# Patient Record
Sex: MALE | URBAN - METROPOLITAN AREA
[De-identification: names, ages, dates, MRNs, and addresses within clinical notes are randomized per-mention and may not be internally consistent; named-entity substitution may affect disease eponyms.]

---

## 2022-06-15 ENCOUNTER — OFFICE VISIT (OUTPATIENT)
Dept: ORTHOPEDIC SURGERY | Age: 42
End: 2022-06-15

## 2022-06-15 DIAGNOSIS — M25.511 RIGHT SHOULDER PAIN, UNSPECIFIED CHRONICITY: Primary | ICD-10-CM

## 2022-06-15 PROBLEM — G25.89 SCAPULAR DYSKINESIS: Status: ACTIVE | Noted: 2021-02-25

## 2022-06-15 PROBLEM — S43.431A GLENOID LABRAL TEAR, RIGHT, INITIAL ENCOUNTER: Status: ACTIVE | Noted: 2021-02-25

## 2022-06-15 PROBLEM — M54.2 NECK PAIN: Status: ACTIVE | Noted: 2021-02-25

## 2022-06-15 PROCEDURE — 99204 OFFICE O/P NEW MOD 45 MIN: CPT | Performed by: ORTHOPAEDIC SURGERY

## 2022-06-15 RX ORDER — MAGNESIUM OXIDE 400 MG/1
400 TABLET ORAL DAILY
COMMUNITY

## 2022-06-15 RX ORDER — HYDRALAZINE HYDROCHLORIDE 25 MG/1
25 TABLET, FILM COATED ORAL 2 TIMES DAILY
COMMUNITY

## 2022-06-15 RX ORDER — CYCLOBENZAPRINE HCL 5 MG
5 TABLET ORAL DAILY
COMMUNITY
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
## 2022-11-04 ENCOUNTER — OFFICE VISIT (OUTPATIENT)
Dept: ORTHOPEDIC SURGERY | Age: 42
End: 2022-11-04

## 2022-11-04 DIAGNOSIS — M25.511 RIGHT SHOULDER PAIN, UNSPECIFIED CHRONICITY: Primary | ICD-10-CM

## 2022-11-04 DIAGNOSIS — G56.01 RIGHT CARPAL TUNNEL SYNDROME: ICD-10-CM

## 2022-11-04 DIAGNOSIS — M67.921 TENDINOPATHY OF RIGHT BICEPS TENDON: ICD-10-CM

## 2022-11-04 PROCEDURE — 99024 POSTOP FOLLOW-UP VISIT: CPT | Performed by: ORTHOPAEDIC SURGERY
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
## 2022-11-10 ENCOUNTER — HOSPITAL ENCOUNTER (OUTPATIENT)
Dept: PHYSICAL THERAPY | Age: 42
Setting detail: RECURRING SERIES
Discharge: HOME | End: 2022-11-13

## 2022-11-10 PROCEDURE — 97110 THERAPEUTIC EXERCISES: CPT

## 2022-11-10 PROCEDURE — 97161 PT EVAL LOW COMPLEX 20 MIN: CPT

## 2022-11-10 PROCEDURE — 97140 MANUAL THERAPY 1/> REGIONS: CPT

## 2022-11-10 ASSESSMENT — PAIN SCALES - GENERAL: PAINLEVEL_OUTOF10: 0

## 2022-11-10 NOTE — PROGRESS NOTES
Estefany Schmitt  : 1980  Primary:   Secondary:  [address] @ [facility]  [address] [facility] [place] [address]  Phone: [phone]  Fax: [phone] Plan Frequency: 2x/week for 90 days    Plan of Care/Certification Expiration Date: 23      PT Visit Info:  No data recorded   Visit Count:  1   OUTPATIENT PHYSICAL THERAPY:OP NOTE TYPE: Treatment Note 11/10/2022       Episode  }Appt Desk             Treatment Diagnosis:  Pain in Right Shoulder (M25.511)  Stiffness of Right Shoulder, Not elsewhere classified (M25.611)  Medical/Referring Diagnosis:  Pain in right shoulder [M25.511]  Referring Physician:  Lester Odom MD MD Orders:  PT Eval and Treat   Date of Onset:  No data recorded   Allergies:   Patient has no allergy information on record. Restrictions/Precautions:  No data recorded  No data recorded   Interventions Planned (Treatment may consist of any combination of the following):    Current Treatment Recommendations: Strengthening; ROM; Functional mobility training; ADL/Self-care training; Neuromuscular re-education; Manual Therapy - Soft Tissue Mobilization; Manual Therapy - Joint Manipulation; Pain management; Home exercise program; Safety education & training; Patient/Caregiver education & training; Equipment evaluation, education, & procurement; Positioning; Aquatics; Therapeutic activities; Modalities; Integrated dry needling     Subjective Comments:  See Evaluation Note from today for details  Initial:}    0/10Post Session:       0/10  Medications Last Reviewed:  11/10/2022  Updated Objective Findings:  See evaluation note from today  Treatment   THERAPEUTIC EXERCISE: (15 minutes):    Exercises per grid below to improve mobility, strength, and coordination. Required minimal visual, verbal, manual, and tactile cues to promote proper body posture and promote proper body mechanics. Progressed resistance, range, repetitions, and complexity of movement as indicated. Date:  11/10 Date:   Date:     Activity/Exercise Parameters Parameters Parameters   Education Plan of car, prognosis, anatomy, HEP     B ER 3x10 blue TB     Shoulder Taps 2x10     Low Rows 3x10 black TB                           MANUAL THERAPY: (15 minutes):   Joint mobilization, Soft tissue mobilization, Manipulation, and dry needling  was utilized and necessary because of the patient's restricted joint motion, painful spasm, loss of articular motion, and restricted motion of soft tissue. Date:  11/10 Date:   Date:     Activity/Exercise Parameters Parameters Parameters   Soft Tissue Supraspinatus, infraspinatus     Joint Mobilization      Dry Needling Infraspinatus    Verbal consent obtained           Treatment/Session Summary:    Treatment Assessment:  See Evaluation Note from today for details  Communication/Consultation:  None today  Equipment provided today:  None  Recommendations/Intent for next treatment session: Next visit will focus on progression as tolerated.     Total Treatment Billable Duration:  45 minutes  Time In: 0160  Time Out: [facility], PT       Charge Capture  }Post Session Pain  PT Visit Info  [facility] Portal  MD Guidelines  Scanned Media  Benefits  MyChart    Future Appointments   Date Time Provider Cain Joy   11/15/2022  9:30 AM Bandar Camden, PT ELVIS JUAREZO   11/17/2022  9:30 AM Bandar Camden, PT LARRYOST LARRYO   11/21/2022  8:45 AM Bandar Camden, PT ELVIS JUAREZO   11/23/2022 10:15 AM Bandar Camden, PT ELVIS JUAREZO   11/29/2022  9:30 AM Bandar Camden, PT [facility]   12/1/2022  9:30 AM GAYLA Patiño O

## 2022-11-10 NOTE — THERAPY EVALUATION
Scar Evans  : 1980  Primary:   Secondary:  [address] @ [facility]  [facility] [address] [facility] [address]  Phone: [phone]  Fax: [phone] Plan Frequency: 2x/week for 90 days  Plan of Care/Certification Expiration Date: 23    PT Visit Info:         Visit Count:  1                OUTPATIENT PHYSICAL THERAPY:             OP NOTE TYPE: Initial Assessment 11/10/2022               Episode  Appt Desk         Treatment Diagnosis:  Pain in Right Shoulder (M25.511)  Stiffness of Right Shoulder, Not elsewhere classified (M25.611)  Medical/Referring Diagnosis:  Pain in right shoulder [M25.511]  Referring Physician:  Aye Joshi MD MD Orders:  PT Eval and Treat   Return MD Appt:    Date of Onset:       Allergies:  Patient has no allergy information on record. Restrictions/Precautions:           Medications Last Reviewed:  11/10/2022     SUBJECTIVE   History of Injury/Illness (Reason for Referral):  [place] Has been having R shoulder pain since then. Pain is primarily located on the top of the shoulder. Did PT for the shoulder about a year ago, got better, so he decided to go back to work. Went back to work 2-3 months ago (works as a ) and started to notice an increase of pain. Pain only comes on when he is using the arm, goes away after he stops using it. Pain now is located in the front of the shoulder and is described as stabbing, pulling. Denies any numbness/tingling in the arm, states he has some burning on the back side of his hand (nothing on the palm). Patient Stated Goal(s): \"Decrease pain, get back to life\"  Initial:     010 Post Session:     010  Past Medical History/Comorbidities:   Mr. Asim Leo  has no past medical history on file. Mr. Asim Leo  has no past surgical history on file.   Social History/Living Environment:   Lives With: Significant other  Type of Home: House     Prior Level of Function/Work/Activity:   Prior level of function: Painting, house/yard maintainance  Prior level of function: Independent  Occupation: Full time employment           Learning:   Does the patient/guardian have any barriers to learning?: No barriers  Will there be a co-learner?: No  What is the preferred language of the patient/guardian?: English  Is an  required?: No  How does the patient/guardian prefer to learn new concepts?: Listening; Reading; Demonstration; Pictures/Videos     Fall Risk Scale: Deluna Total Score: 0  Deluna Fall Risk: Low (0-24)     Personal Factors:        Sex:  male        Age:  38 y.o. OBJECTIVE   Observation  Posture:    ROM  Shoulder   Right Left   Flexion 170* 170   * 170   IR T12* T9   ER T3* T4       Strength (measured on MMT scale from 0-5/5)   Right Left   Shoulder         Flexion 4+* 5       ABD 4+* 5       IR 4+* 5       ER 4+* 5       Joint Mobility/Soft Tissue   Description   Joint Mobility Hypomobile   Soft Tissue Mobility TTP in infraspinatus, supraspinatus, biceps       Special Tests  HK: (+)  Neer's: (+)  90/90 ER: (+)  Empty Can: (+)    ASSESSMENT   Initial Assessment:  Aysha Hall presents to physical therapy with MD diagnosis of a right shoulder pain. Pt demonstrated signs and symptoms consistent with this diagnosis. On objective examination, the patient demonstrated deficits in ROM, strength, joint mobility, soft tissue mobility, functional ability, and functional mobility. The patient also had increased pain. The patient is limited in the following activities: reaching, lifting, carrying, overhead activities, work, ADLs, functional tasks. The patient has a good  prognosis for recovery based on the examination findings and may be limited by: N/A. Patient requires skilled physical therapy services in order to return to prior level of function. Problem List: (Impacting functional limitations):     Body Structures, Functions, Activity Limitations Requiring Skilled Therapeutic Intervention: Decreased functional mobility ; Decreased ADL status; Decreased ROM; Decreased body mechanics; Decreased tolerance to work activity; Decreased strength; Decreased high-level IADLs; Increased pain; Decreased posture     Therapy Prognosis:   Therapy Prognosis: Good     Initial Assessment Complexity:   Decision Making: Low Complexity    PLAN   Effective Dates: 11/10/22 TO Plan of Care/Certification Expiration Date: 02/08/23   Frequency/Duration: Plan Frequency: 2x/week for 90 days   Interventions Planned (Treatment may consist of any combination of the following):    Current Treatment Recommendations: Strengthening; ROM; Functional mobility training; ADL/Self-care training; Neuromuscular re-education; Manual Therapy - Soft Tissue Mobilization; Manual Therapy - Joint Manipulation; Pain management; Home exercise program; Safety education & training; Patient/Caregiver education & training; Equipment evaluation, education, & procurement; Positioning; Aquatics; Therapeutic activities; Modalities; Integrated dry needling     Goals: (Goals have been discussed and agreed upon with patient.)  Short-Term Functional Goals: Time Frame: 4 weeks  Pt will be compliant with progressive HEP  Discharge Goals: Time Frame: 10 weeks  Pt will improve BRITTANY score by 10pts  Pt will be able to lift 10lbs overhead with pain < 2/10  Pt will have pain free shoulder ROM         Outcome Measure: Tool Used: BRITTANY Shoulder Score  Initial Score: 63/100 Most Recent: X/100 (Date: XX/XX)   Interpretation of Score: 20 questions each scored on a 3 point scale with 0 representing \"extreme difficulty or unable to perform\" and 3 representing \"no difficulty\", 3 questions each scored from 0-10 about pain, and 1 question scored 0-10 about function of the shoulder  The lower the score, the greater the functional disability. 100/100 represents no disability, pain or loss of function.   Minimal clinically important

## 2022-11-15 ENCOUNTER — HOSPITAL ENCOUNTER (OUTPATIENT)
Dept: PHYSICAL THERAPY | Age: 42
Setting detail: RECURRING SERIES
Discharge: HOME | End: 2022-11-18

## 2022-11-15 PROCEDURE — 97110 THERAPEUTIC EXERCISES: CPT

## 2022-11-15 PROCEDURE — 97140 MANUAL THERAPY 1/> REGIONS: CPT

## 2022-11-15 ASSESSMENT — PAIN SCALES - GENERAL: PAINLEVEL_OUTOF10: 0

## 2022-11-15 NOTE — PROGRESS NOTES
Jorge Cowan  : 1980  Primary:   Secondary:  Amy Morocho @ [facility]  [address] [place] [address]  Phone: [phone]  Fax: [phone] Plan Frequency: 2x/week for 90 days    Plan of Care/Certification Expiration Date: 23      PT Visit Info:  No data recorded   Visit Count:  2   OUTPATIENT PHYSICAL THERAPY:OP NOTE TYPE: Treatment Note 11/15/2022       Episode  }Appt Desk             Treatment Diagnosis:  Pain in Right Shoulder (M25.511)  Stiffness of Right Shoulder, Not elsewhere classified (M25.611)  Medical/Referring Diagnosis:  Pain in right shoulder [M25.511]  Referring Physician:  Billy Hall MD MD Orders:  PT Eval and Treat   Date of Onset:  No data recorded   Allergies:   Patient has no allergy information on record. Restrictions/Precautions:  No data recorded  No data recorded   Interventions Planned (Treatment may consist of any combination of the following):    Current Treatment Recommendations: Strengthening; ROM; Functional mobility training; ADL/Self-care training; Neuromuscular re-education; Manual Therapy - Soft Tissue Mobilization; Manual Therapy - Joint Manipulation; Pain management; Home exercise program; Safety education & training; Patient/Caregiver education & training; Equipment evaluation, education, & procurement; Positioning; Aquatics; Therapeutic activities; Modalities; Integrated dry needling     Subjective Comments:  Pt states he is feeling a little better today, can bear more weight thru arms  Initial:}    0/10Post Session:       0/10  Medications Last Reviewed:  11/15/2022  Updated Objective Findings:  See evaluation note from today  Treatment   THERAPEUTIC EXERCISE: (30 minutes):    Exercises per grid below to improve mobility, strength, and coordination. Required minimal visual, verbal, manual, and tactile cues to promote proper body posture and promote proper body mechanics.   Progressed resistance, range, repetitions, and complexity of movement as indicated. Date:  11/10 Date:  11/15 Date:     Activity/Exercise Parameters Parameters Parameters   Education Plan of car, prognosis, anatomy, HEP     B ER 3x10 blue TB 3x10 blue TB    Shoulder Taps 2x10     Low Rows 3x10 black TB     ER Walkout  Eccentric load with Black TB 3x6    Sidelying ER w/ press  3x8 5lbs    Bicep Stretch w/ Wand  3x30 sec        MANUAL THERAPY: (15 minutes):   Joint mobilization, Soft tissue mobilization, Manipulation, and dry needling  was utilized and necessary because of the patient's restricted joint motion, painful spasm, loss of articular motion, and restricted motion of soft tissue. Date:  11/10 Date:  11/15 Date:     Activity/Exercise Parameters Parameters Parameters   Soft Tissue Supraspinatus, infraspinatus Supraspinatus, infraspinatus    Joint Mobilization      Dry Needling Infraspinatus    Verbal consent obtained Infraspinatus, biceps          Treatment/Session Summary:    Treatment Assessment:  Pt tolerated session well, no pain with exercise but did have some fatigue  Communication/Consultation:  None today  Equipment provided today:  None  Recommendations/Intent for next treatment session: Next visit will focus on progression as tolerated.     Total Treatment Billable Duration:  45 minutes  Time In: 0236  Time Out: [facility], PT       Charge Capture  }Post Session Pain  PT Visit Info  "[employer]" Portal  MD Guidelines  Scanned Media  Benefits  MyChart    Future Appointments   Date Time Provider Cain Joy   11/17/2022  9:30 AM Tara Fuller PT [facility]   11/21/2022  8:45 AM Tara Fuller PT [facility]   11/23/2022 10:15 AM SOSA Benitez   11/29/2022  9:30 AM Tara Fuller PT [facility]   12/1/2022  9:30 AM GAYLA Andrew [facility]

## 2022-11-17 ENCOUNTER — HOSPITAL ENCOUNTER (OUTPATIENT)
Dept: PHYSICAL THERAPY | Age: 42
Setting detail: RECURRING SERIES
Discharge: HOME | End: 2022-11-20

## 2022-11-17 PROCEDURE — 97140 MANUAL THERAPY 1/> REGIONS: CPT

## 2022-11-17 PROCEDURE — 97110 THERAPEUTIC EXERCISES: CPT

## 2022-11-17 ASSESSMENT — PAIN SCALES - GENERAL: PAINLEVEL_OUTOF10: 2

## 2022-11-17 NOTE — PROGRESS NOTES
Melecio Madden  : 1980  Primary:   Secondary:  [address] @ [facility]  [address] [facility] [address]  Phone: [phone]  Fax: [phone] Plan Frequency: 2x/week for 90 days    Plan of Care/Certification Expiration Date: 23      PT Visit Info:  No data recorded   Visit Count:  3   OUTPATIENT PHYSICAL THERAPY:OP NOTE TYPE: Treatment Note 2022       Episode  }Appt Desk             Treatment Diagnosis:  Pain in Right Shoulder (M25.511)  Stiffness of Right Shoulder, Not elsewhere classified (M25.611)  Medical/Referring Diagnosis:  Pain in right shoulder [M25.511]  Referring Physician:  Charles Ernandez MD MD Orders:  PT Eval and Treat   Date of Onset:  No data recorded   Allergies:   Patient has no allergy information on record. Restrictions/Precautions:  No data recorded  No data recorded   Interventions Planned (Treatment may consist of any combination of the following):    Current Treatment Recommendations: Strengthening; ROM; Functional mobility training; ADL/Self-care training; Neuromuscular re-education; Manual Therapy - Soft Tissue Mobilization; Manual Therapy - Joint Manipulation; Pain management; Home exercise program; Safety education & training; Patient/Caregiver education & training; Equipment evaluation, education, & procurement; Positioning; Aquatics; Therapeutic activities; Modalities; Integrated dry needling     Subjective Comments:  Pt states he is feeeling better and has more ROM  Initial:}    2/10Post Session:       0/10  Medications Last Reviewed:  2022  Updated Objective Findings:  See evaluation note from today  Treatment   THERAPEUTIC EXERCISE: (30 minutes):    Exercises per grid below to improve mobility, strength, and coordination. Required minimal visual, verbal, manual, and tactile cues to promote proper body posture and promote proper body mechanics.   Progressed resistance, range, repetitions, and complexity of movement as indicated. Date:  11/10 Date:  11/15 Date:  11/17   Activity/Exercise Parameters Parameters Parameters   Education Plan of car, prognosis, anatomy, HEP     B ER 3x10 blue TB 3x10 blue TB 3x10 blue TB   Shoulder Taps 2x10     Low Rows 3x10 black TB  3x10 blue TB   ER Walkout  Eccentric load with Black TB 3x6 Eccentric load with Black TB 3x6   Sidelying ER w/ press  3x8 5lbs    Bicep Stretch w/ Wand  3x30 sec 3x30 sec   Snow [place]   3x5 blue TB   Bicep Curls   3x10 25lbs (eccentric)                         MANUAL THERAPY: (15 minutes):   Joint mobilization, Soft tissue mobilization, Manipulation, and dry needling  was utilized and necessary because of the patient's restricted joint motion, painful spasm, loss of articular motion, and restricted motion of soft tissue. Date:  11/10 Date:  11/15 Date:  11/17   Activity/Exercise Parameters Parameters Parameters   Soft Tissue Supraspinatus, infraspinatus Supraspinatus, infraspinatus Supraspinatus, infraspinatus   Joint Mobilization      Dry Needling Infraspinatus    Verbal consent obtained Infraspinatus, biceps biceps         Treatment/Session Summary:    Treatment Assessment:  Pt tolerated session well, had decreased pain and tightness following MT and eccentric exercises  Communication/Consultation:  None today  Equipment provided today:  None  Recommendations/Intent for next treatment session: Next visit will focus on progression as tolerated.     Total Treatment Billable Duration:  45 minutes  Time In: 3652  Time Out: [address]  }Post Session Pain  PT Visit Info  [employer] Portal  MD Guidelines  Scanned Media  Benefits  MyChart    Future Appointments   Date Time Provider Cain Joy   11/21/2022  8:45 AM Danae Gu PT [facility]   11/23/2022 10:15 AM SOSA Burt O   11/29/2022  9:30 AM Danae Gu PT [facility]   12/1/2022  9:30 AM Leslie Caraballo PTA [facility] O

## 2022-11-21 ENCOUNTER — HOSPITAL ENCOUNTER (OUTPATIENT)
Dept: PHYSICAL THERAPY | Age: 42
Setting detail: RECURRING SERIES
Discharge: HOME | End: 2022-11-24

## 2022-11-21 PROCEDURE — 97110 THERAPEUTIC EXERCISES: CPT

## 2022-11-21 ASSESSMENT — PAIN SCALES - GENERAL: PAINLEVEL_OUTOF10: 2

## 2022-11-21 NOTE — PROGRESS NOTES
Aysha Hall  : 1980  Primary:   Secondary:  [address] @  [facility]  [address] [facility] [address]  Phone: [phone]  Fax: [phone] Plan Frequency: 2x/week for 90 days    Plan of Care/Certification Expiration Date: 23      PT Visit Info:  No data recorded   Visit Count:  4   OUTPATIENT PHYSICAL THERAPY:OP NOTE TYPE: Treatment Note 2022       Episode  }Appt Desk             Treatment Diagnosis:  Pain in Right Shoulder (M25.511)  Stiffness of Right Shoulder, Not elsewhere classified (M25.611)  Medical/Referring Diagnosis:  Pain in right shoulder [M25.511]  Referring Physician:  Keke Mosquera MD MD Orders:  PT Eval and Treat   Date of Onset:  No data recorded   Allergies:   Patient has no allergy information on record. Restrictions/Precautions:  No data recorded  No data recorded   Interventions Planned (Treatment may consist of any combination of the following):    Current Treatment Recommendations: Strengthening; ROM; Functional mobility training; ADL/Self-care training; Neuromuscular re-education; Manual Therapy - Soft Tissue Mobilization; Manual Therapy - Joint Manipulation; Pain management; Home exercise program; Safety education & training; Patient/Caregiver education & training; Equipment evaluation, education, & procurement; Positioning; Aquatics; Therapeutic activities; Modalities; Integrated dry needling     Subjective Comments:  Pt states he is doing well, improved flexibility and less pain  Initial:}    2/10Post Session:       0/10  Medications Last Reviewed:  2022  Updated Objective Findings:  See evaluation note from today  Treatment   THERAPEUTIC EXERCISE: (40 minutes):    Exercises per grid below to improve mobility, strength, and coordination. Required minimal visual, verbal, manual, and tactile cues to promote proper body posture and promote proper body mechanics.   Progressed resistance, range, repetitions, and complexity of movement as indicated. Date:  11/15 Date:  11/17 Date:  11/21   Activity/Exercise Parameters Parameters    Education      B ER 3x10 blue TB 3x10 blue TB 4x60ft walking blue TB   Shoulder Taps   2x10 B   Low Rows  3x10 blue TB 3x10 blue TB   ER Walkout Eccentric load with Black TB 3x6 Eccentric load with Black TB 3x6 Eccentric load with Black TB 4x5   Sidelying ER w/ press 3x8 5lbs     Bicep Stretch w/ Wand 3x30 sec 3x30 sec    Snow [place]  3x5 blue TB 3x5 blue TB   Bicep Curls  3x10 25lbs (eccentric) 3x10 25lbs (eccentric)   Ball Bounces   3x fatigue 3lbs ball                   MANUAL THERAPY: (5 minutes):   Joint mobilization, Soft tissue mobilization, Manipulation, and dry needling  was utilized and necessary because of the patient's restricted joint motion, painful spasm, loss of articular motion, and restricted motion of soft tissue. Date:  11/15 Date:  11/17 Date:  11/21   Activity/Exercise Parameters Parameters    Soft Tissue Supraspinatus, infraspinatus Supraspinatus, infraspinatus    Joint Mobilization      Dry Needling Infraspinatus, biceps biceps Supraspinatus         Treatment/Session Summary:    Treatment Assessment:  Pt tolerated sesison well, improved shoulder strength and mobility  Communication/Consultation:  None today  Equipment provided today:  None  Recommendations/Intent for next treatment session: Next visit will focus on progression as tolerated.     Total Treatment Billable Duration:  45 minutes  Time In: 2904  Time Out: 1[facility], PT       Charge Capture  }Post Session Pain  PT Visit Info  [facility] Portal  MD Guidelines  Scanned Media  Benefits  MyChart    Future Appointments   Date Time Provider Cain Joy   11/23/2022 10:15 AM Long Fatima PT [facility]   11/29/2022  9:30 AM Long Fatima PT [facility]   12/1/2022  9:30 AM Eriberto Chu PTA [facility]

## 2022-11-23 ENCOUNTER — HOSPITAL ENCOUNTER (OUTPATIENT)
Dept: PHYSICAL THERAPY | Age: 42
Setting detail: RECURRING SERIES
Discharge: HOME | End: 2022-11-26

## 2022-11-23 PROCEDURE — 97140 MANUAL THERAPY 1/> REGIONS: CPT

## 2022-11-23 PROCEDURE — 97110 THERAPEUTIC EXERCISES: CPT

## 2022-11-23 NOTE — PROGRESS NOTES
Piter Muniz  : 1980  Primary:   Secondary:  [address] @ [facility]  [address] [facility] Michelle Bermudez [place] [address]  Phone: [phone]  Fax: [phone] Plan Frequency: 2x/week for 90 days    Plan of Care/Certification Expiration Date: 23      PT Visit Info:  No data recorded   Visit Count:  5   OUTPATIENT PHYSICAL THERAPY:OP NOTE TYPE: Treatment Note 2022       Episode  }Appt Desk             Treatment Diagnosis:  Pain in Right Shoulder (M25.511)  Stiffness of Right Shoulder, Not elsewhere classified (M25.611)  Medical/Referring Diagnosis:  Pain in right shoulder [M25.511]  Referring Physician:  Kevin Hill MD MD Orders:  PT Eval and Treat   Date of Onset:  No data recorded   Allergies:   Patient has no allergy information on record. Restrictions/Precautions:  No data recorded  No data recorded   Interventions Planned (Treatment may consist of any combination of the following):    Current Treatment Recommendations: Strengthening; ROM; Functional mobility training; ADL/Self-care training; Neuromuscular re-education; Manual Therapy - Soft Tissue Mobilization; Manual Therapy - Joint Manipulation; Pain management; Home exercise program; Safety education & training; Patient/Caregiver education & training; Equipment evaluation, education, & procurement; Positioning; Aquatics; Therapeutic activities; Modalities; Integrated dry needling     Subjective Comments:  Pt states he is doing well, feeling better and has improved moblity  Initial:}     /10Post Session:        /10  Medications Last Reviewed:  2022  Updated Objective Findings:  See evaluation note from today  Treatment   THERAPEUTIC EXERCISE: (30 minutes):    Exercises per grid below to improve mobility, strength, and coordination. Required minimal visual, verbal, manual, and tactile cues to promote proper body posture and promote proper body mechanics.   Progressed resistance, range, repetitions, and complexity of movement as indicated. Date:  11/17 Date:  11/21 Date:  11/23   Activity/Exercise Parameters     Education      B ER 3x10 blue TB 4x60ft walking blue TB 4x60ft walking blue TB   Shoulder Taps  2x10 B    Low Rows 3x10 blue TB 3x10 blue TB    ER Walkout Eccentric load with Black TB 3x6 Eccentric load with Black TB 4x5 Eccentric load with Black TB 4x5 (90/90)   Sidelying ER w/ press      Bicep Stretch w/ Wand 3x30 sec     Snow [place] 3x5 blue TB 3x5 blue TB 3x5 blue TB   Bicep Curls 3x10 25lbs (eccentric) 3x10 25lbs (eccentric) 3x10 25lbs (eccentric)   Ball Bounces  3x fatigue 3lbs ball    Low Trap Squeeze   2x10   Self Mobilization   X5min (inf)                         MANUAL THERAPY: (15 minutes):   Joint mobilization, Soft tissue mobilization, Manipulation, and dry needling  was utilized and necessary because of the patient's restricted joint motion, painful spasm, loss of articular motion, and restricted motion of soft tissue. Date:  11/17 Date:  11/21 Date:  11/23   Activity/Exercise Parameters     Soft Tissue Supraspinatus, infraspinatus     Joint Mobilization   GHJ: A-P/Inf grades II-IV   Dry Needling biceps Supraspinatus          Treatment/Session Summary:    Treatment Assessment:  Pt had improved ROM and decreased pain and tightness following MT  Communication/Consultation:  None today  Equipment provided today:  None  Recommendations/Intent for next treatment session: Next visit will focus on progression as tolerated.     Total Treatment Billable Duration:  45 minutes  Time In: 9864  Time Out: [address], PT       Charge Capture  }Post Session Pain  PT Visit Info  [employer] Portal  MD Guidelines  Scanned Media  Benefits  MyChart    Future Appointments   Date Time Provider Cain Joy   11/29/2022  9:30 AM Nino Umana, PT [facility]   12/1/2022  9:30 AM Rodger Shrestha PTA [facility]

## 2022-11-29 ENCOUNTER — HOSPITAL ENCOUNTER (OUTPATIENT)
Dept: PHYSICAL THERAPY | Age: 42
Setting detail: RECURRING SERIES
Discharge: HOME | End: 2022-12-02

## 2022-11-29 PROCEDURE — 97110 THERAPEUTIC EXERCISES: CPT

## 2022-11-29 PROCEDURE — 97140 MANUAL THERAPY 1/> REGIONS: CPT

## 2022-11-29 ASSESSMENT — PAIN SCALES - GENERAL: PAINLEVEL_OUTOF10: 1

## 2022-11-29 NOTE — PROGRESS NOTES
Payal Led  : 1980  Primary:   Secondary:  [address] @ [facility]  [address] [facility] [place] [address]  Phone: [phone]  Fax: [phone] Plan Frequency: 2x/week for 90 days    Plan of Care/Certification Expiration Date: 23      PT Visit Info:  No data recorded   Visit Count:  6   OUTPATIENT PHYSICAL THERAPY:OP NOTE TYPE: Treatment Note 2022       Episode  }Appt Desk             Treatment Diagnosis:  Pain in Right Shoulder (M25.511)  Stiffness of Right Shoulder, Not elsewhere classified (M25.611)  Medical/Referring Diagnosis:  Pain in right shoulder [M25.511]  Referring Physician:  Natalie Ramos MD MD Orders:  PT Eval and Treat   Date of Onset:  No data recorded   Allergies:   Patient has no allergy information on record. Restrictions/Precautions:  No data recorded  No data recorded   Interventions Planned (Treatment may consist of any combination of the following):    Current Treatment Recommendations: Strengthening; ROM; Functional mobility training; ADL/Self-care training; Neuromuscular re-education; Manual Therapy - Soft Tissue Mobilization; Manual Therapy - Joint Manipulation; Pain management; Home exercise program; Safety education & training; Patient/Caregiver education & training; Equipment evaluation, education, & procurement; Positioning; Aquatics; Therapeutic activities; Modalities; Integrated dry needling     Subjective Comments:  Pt states he continues to improve and reduce his pain  Initial:}    10Post Session:       0/10  Medications Last Reviewed:  2022  Updated Objective Findings:  See evaluation note from today  Treatment   THERAPEUTIC EXERCISE: (30 minutes):    Exercises per grid below to improve mobility, strength, and coordination. Required minimal visual, verbal, manual, and tactile cues to promote proper body posture and promote proper body mechanics.   Progressed resistance, range, repetitions, and complexity of movement as indicated. Date:  11/21 Date:  11/23 Date:  11/29   Activity/Exercise      Education      B ER 4x60ft walking blue TB 4x60ft walking blue TB    Shoulder Taps 2x10 B     Low Rows 3x10 blue TB     ER Walkout Eccentric load with Black TB 4x5 Eccentric load with Black TB 4x5 (90/90)    Sidelying ER w/ press      Bicep Stretch w/ Wand   3x30 sec   Snow [place] 3x5 blue TB 3x5 blue TB    Bicep Curls 3x10 25lbs (eccentric) 3x10 25lbs (eccentric) 3x6 25lbs (eccentric)   Ball Bounces 3x fatigue 3lbs ball  3lbs up and down wall   Low Trap Squeeze  2x10    Self Mobilization  X5min (inf)    D2 Pattern   Blue TB 3x8    Stability on Swiss ball      Ball circles   2x10 CW/CCW       MANUAL THERAPY: (15 minutes):   Joint mobilization, Soft tissue mobilization, Manipulation, and dry needling  was utilized and necessary because of the patient's restricted joint motion, painful spasm, loss of articular motion, and restricted motion of soft tissue. Date:  11/21 Date:  11/23 Date:  11/29   Activity/Exercise      Soft Tissue   Insertion of bicepts tendon   Joint Mobilization  GHJ: A-P/Inf grades II-IV GHJ: A-P/Inf grades II-IV   Dry Needling Supraspinatus           Treatment/Session Summary:    Treatment Assessment:  Pt tolerated tx well. He is increasing ROM and has a decrease in pain. Communication/Consultation:  None today  Equipment provided today:  None  Recommendations/Intent for next treatment session: Next visit will focus on progression as tolerated.     Total Treatment Billable Duration:  45 minutes       [employer] Capture  }Post Session Pain  PT Visit [address] [facility] Portal  MD Guidelines  Scanned Media  Benefits  MyChart    Future Appointments   Date Time Provider Cain Joy   12/1/2022  9:30 AM Yuniel Fernandez PTA [facility]

## 2022-12-01 ENCOUNTER — HOSPITAL ENCOUNTER (OUTPATIENT)
Dept: PHYSICAL THERAPY | Age: 42
Setting detail: RECURRING SERIES
Discharge: HOME | End: 2022-12-04

## 2022-12-01 PROCEDURE — 97110 THERAPEUTIC EXERCISES: CPT

## 2022-12-01 NOTE — PROGRESS NOTES
Tanmay Davila  : 1980  Primary:   Secondary:  [address] @ [facility]  [address] [facility] [place] [address]  Phone: [phone]  Fax: [phone] Plan Frequency: 2x/week for 90 days    Plan of Care/Certification Expiration Date: 23      PT Visit Info:  No data recorded   Visit Count:  7   OUTPATIENT PHYSICAL THERAPY:OP NOTE TYPE: Treatment Note 2022       Episode  }Appt Desk             Treatment Diagnosis:  Pain in Right Shoulder (M25.511)  Stiffness of Right Shoulder, Not elsewhere classified (M25.611)  Medical/Referring Diagnosis:  Pain in right shoulder [M25.511]  Referring Physician:  Calderon Squires MD MD Orders:  PT Eval and Treat   Date of Onset:  No data recorded   Allergies:   Patient has no allergy information on record. Restrictions/Precautions:  No data recorded  No data recorded   Interventions Planned (Treatment may consist of any combination of the following):    Current Treatment Recommendations: Strengthening; ROM; Functional mobility training; ADL/Self-care training; Neuromuscular re-education; Manual Therapy - Soft Tissue Mobilization; Manual Therapy - Joint Manipulation; Pain management; Home exercise program; Safety education & training; Patient/Caregiver education & training; Equipment evaluation, education, & procurement; Positioning; Aquatics; Therapeutic activities; Modalities; Integrated dry needling     Subjective Comments:     Initial:}     /10Post Session:        /10  Medications Last Reviewed:  2022  Updated Objective Findings:  See evaluation note from today  Treatment   THERAPEUTIC EXERCISE: (45minutes):    Exercises per grid below to improve mobility, strength, and coordination. Required minimal visual, verbal, manual, and tactile cues to promote proper body posture and promote proper body mechanics. Progressed resistance, range, repetitions, and complexity of movement as indicated.      Date:   Date:   Date:   Date  12/1   Activity/Exercise       Education       B ER 4x60ft walking blue TB 4x60ft walking blue TB     Shoulder Taps 2x10 B   3 x 10 B   Low Rows 3x10 blue TB      ER Walkout Eccentric load with Black TB 4x5 Eccentric load with Black TB 4x5 (90/90)     Sidelying ER w/ press       Bicep Stretch w/ Wand   3x30 sec 3x30 sec   Snow [place] 3x5 blue TB 3x5 blue TB     Bicep Curls 3x10 25lbs (eccentric) 3x10 25lbs (eccentric) 3x6 25lbs (eccentric) 3x6 25lbs (eccentric)   Ball Bounces 3x fatigue 3lbs ball  3lbs up and down wall 3lbs up and down wall   Low Trap Squeeze  2x10     Self Mobilization  X5min (inf)     D2 Pattern   Blue TB 3x8  Blue TB 3x8    Stability on Swiss ball       Ball circles   2x10 CW/CCW 2x10 CW/CCW       MANUAL THERAPY: (minutes):   Joint mobilization, Soft tissue mobilization, Manipulation, and dry needling  was utilized and necessary because of the patient's restricted joint motion, painful spasm, loss of articular motion, and restricted motion of soft tissue. Date:  11/21 Date:  11/23 Date:  11/29   Activity/Exercise      Soft Tissue   Insertion of bicepts tendon   Joint Mobilization  GHJ: A-P/Inf grades II-IV GHJ: A-P/Inf grades II-IV   Dry Needling Supraspinatus           Treatment/Session Summary:    Treatment Assessment:  Patient tolerated treatment well today. Instructed patient to continue home exercises as directed. Tried taping today  Communication/Consultation:  None today  Equipment provided today:  None  Recommendations/Intent for next treatment session: Next visit will focus on progression as tolerated. Total Treatment Billable Duration:  45 minutes  Time In: 0930  Time Out: [address], [place]       Charge Capture  }Post Session Pain  PT Visit Info  [employer] Portal  MD Guidelines  Scanned Media  Benefits  MyChart    No future appointments.

## 2025-05-30 ENCOUNTER — TELEPHONE (OUTPATIENT)
Dept: ORTHOPEDIC SURGERY | Age: 45
End: 2025-05-30

## 2025-05-30 NOTE — TELEPHONE ENCOUNTER
Gracie Lerma would like a return call to schedule the deposition.  She is hoping to schedule this the week of July 14th.

## 2025-06-04 NOTE — TELEPHONE ENCOUNTER
Spoke with Gracie and let her know that we need to receive payment before scheduling. She expressed understanding and we will schedule once payment is received.